# Patient Record
Sex: FEMALE | Race: WHITE
[De-identification: names, ages, dates, MRNs, and addresses within clinical notes are randomized per-mention and may not be internally consistent; named-entity substitution may affect disease eponyms.]

---

## 2021-04-06 ENCOUNTER — HOSPITAL ENCOUNTER (EMERGENCY)
Dept: HOSPITAL 76 - ED | Age: 56
Discharge: HOME | End: 2021-04-06
Payer: MEDICAID

## 2021-04-06 VITALS — SYSTOLIC BLOOD PRESSURE: 151 MMHG | DIASTOLIC BLOOD PRESSURE: 87 MMHG

## 2021-04-06 DIAGNOSIS — E66.9: ICD-10-CM

## 2021-04-06 DIAGNOSIS — I10: ICD-10-CM

## 2021-04-06 DIAGNOSIS — H43.392: ICD-10-CM

## 2021-04-06 DIAGNOSIS — R07.9: Primary | ICD-10-CM

## 2021-04-06 LAB
ALBUMIN DIAFP-MCNC: 4.6 G/DL (ref 3.2–5.5)
ALBUMIN/GLOB SERPL: 1.4 {RATIO} (ref 1–2.2)
ALP SERPL-CCNC: 75 IU/L (ref 42–121)
ALT SERPL W P-5'-P-CCNC: 91 IU/L (ref 10–60)
ANION GAP SERPL CALCULATED.4IONS-SCNC: 9 MMOL/L (ref 6–13)
AST SERPL W P-5'-P-CCNC: 64 IU/L (ref 10–42)
BASOPHILS NFR BLD AUTO: 0.1 10^3/UL (ref 0–0.1)
BASOPHILS NFR BLD AUTO: 0.6 %
BILIRUB BLD-MCNC: 1.3 MG/DL (ref 0.2–1)
BUN SERPL-MCNC: 10 MG/DL (ref 6–20)
CALCIUM UR-MCNC: 9.6 MG/DL (ref 8.5–10.3)
CHLORIDE SERPL-SCNC: 106 MMOL/L (ref 101–111)
CO2 SERPL-SCNC: 27 MMOL/L (ref 21–32)
CREAT SERPLBLD-SCNC: 0.7 MG/DL (ref 0.4–1)
EOSINOPHIL # BLD AUTO: 0.2 10^3/UL (ref 0–0.7)
EOSINOPHIL NFR BLD AUTO: 3 %
ERYTHROCYTE [DISTWIDTH] IN BLOOD BY AUTOMATED COUNT: 12.3 % (ref 12–15)
GFRSERPLBLD MDRD-ARVRAT: 87 ML/MIN/{1.73_M2} (ref 89–?)
GLOBULIN SER-MCNC: 3.3 G/DL (ref 2.1–4.2)
GLUCOSE SERPL-MCNC: 106 MG/DL (ref 70–100)
HCT VFR BLD AUTO: 43.8 % (ref 37–47)
HGB UR QL STRIP: 15.1 G/DL (ref 12–16)
INR PPP: 1.1 (ref 0.8–1.2)
LIPASE SERPL-CCNC: 28 U/L (ref 22–51)
LYMPHOCYTES # SPEC AUTO: 2.6 10^3/UL (ref 1.5–3.5)
LYMPHOCYTES NFR BLD AUTO: 33.4 %
MCH RBC QN AUTO: 32.8 PG (ref 27–31)
MCHC RBC AUTO-ENTMCNC: 34.5 G/DL (ref 32–36)
MCV RBC AUTO: 95.2 FL (ref 81–99)
MONOCYTES # BLD AUTO: 0.6 10^3/UL (ref 0–1)
MONOCYTES NFR BLD AUTO: 7.7 %
NEUTROPHILS # BLD AUTO: 4.2 10^3/UL (ref 1.5–6.6)
NEUTROPHILS # SNV AUTO: 7.8 X10^3/UL (ref 4.8–10.8)
NEUTROPHILS NFR BLD AUTO: 54.4 %
NRBC # BLD AUTO: 0 /100WBC
NRBC # BLD AUTO: 0 X10^3/UL
PDW BLD AUTO: 9.7 FL (ref 7.9–10.8)
PLATELET # BLD: 249 10^3/UL (ref 130–450)
POTASSIUM SERPL-SCNC: 3.9 MMOL/L (ref 3.5–5)
PROT SPEC-MCNC: 7.9 G/DL (ref 6.7–8.2)
PROTHROM ACT/NOR PPP: 11.9 SECS (ref 9.9–12.6)
RBC MAR: 4.6 10^6/UL (ref 4.2–5.4)
SODIUM SERPLBLD-SCNC: 142 MMOL/L (ref 135–145)

## 2021-04-06 PROCEDURE — 85025 COMPLETE CBC W/AUTO DIFF WBC: CPT

## 2021-04-06 PROCEDURE — 84484 ASSAY OF TROPONIN QUANT: CPT

## 2021-04-06 PROCEDURE — 80053 COMPREHEN METABOLIC PANEL: CPT

## 2021-04-06 PROCEDURE — 85610 PROTHROMBIN TIME: CPT

## 2021-04-06 PROCEDURE — 93005 ELECTROCARDIOGRAM TRACING: CPT

## 2021-04-06 PROCEDURE — 84443 ASSAY THYROID STIM HORMONE: CPT

## 2021-04-06 PROCEDURE — 99284 EMERGENCY DEPT VISIT MOD MDM: CPT

## 2021-04-06 PROCEDURE — 83690 ASSAY OF LIPASE: CPT

## 2021-04-06 PROCEDURE — 84439 ASSAY OF FREE THYROXINE: CPT

## 2021-04-06 NOTE — XRAY REPORT
PROCEDURE:  Chest 1 View X-Ray

 

INDICATIONS:  Chest Pain

 

TECHNIQUE:  One view of the chest was acquired.  

 

COMPARISON:  None

 

FINDINGS:  

 

Surgical changes and devices: Surgical clips are seen in the lower neck soft tissue.

 

Lungs and pleura:  No pleural effusions or pneumothorax.  Lungs are clear.  

 

Mediastinum:  Mediastinal contours appear normal.  Heart size is normal.  

 

Bones and chest wall:  No suspicious bony lesions.  Overlying soft tissues appear unremarkable.  

 

IMPRESSION:  

No acute cardiopulmonary pathology.

 

Reviewed by: Jose R Bronson MD on 4/6/2021 1:42 PM PDT

Approved by: Jose R Bronson MD on 4/6/2021 1:42 PM PDT

 

 

Station ID:  [id number]

## 2021-04-06 NOTE — ED PHYSICIAN DOCUMENTATION
History of Present Illness





- Stated complaint


Stated Complaint: CHEST PX





- Chief complaint


Chief Complaint: Cardiac





- Additonal information


Additional information: 


55-year-old female presents emergency department for evaluation of ongoing chest

pain.  She reports that she has had this chest pain for about 6 months.  It is 

described as constant in nature.  Not typically worse with exertion.  She 

sometimes feels pressure in her chest with breathing but does not clearly state 

that it is pleuritic.  She did see her primary care doctor in [place] for 

this about 6 months ago and was told she had costochondritis.  She moved to 

[facility] about 4 months ago and at the time of her move she stopped taking

her hydrochlorothiazide as she reports that her blood pressure was well 

controlled.  This episode of chest pain began about 1 weeks ago and she does not

it followed lifting heavy logs while cutting wood





In addition to the chest pain she reports that yesterday evening she removed her

contacts and for about 20 minutes she had a floaters that were in a crescent 

shape pattern in the peripheral vision of her left eye.  There was no loss of 

vision and there was no eye pain associated with this.  She has no history of 

this previous. Denies a hx of migraines or headache with this event.  has normal

vision without deficits at this time





She denies headaches, nausea uncontrolled vomiting.





She is not a smoker.  Very rare alcohol user.  No cannabis.





Meds: Levothyroxine.








Review of Systems


Constitutional: denies: Fever, Chills


Eyes: reports: Other (visual floaters left eye)


Ears: reports: Reviewed and negative


Nose: reports: Reviewed and negative


Throat: reports: Reviewed and negative


Cardiac: reports: Chest pain / pressure, Palpitations.  denies: Pedal edema, 

Calf pain


Respiratory: reports: Reviewed and negative


GI: reports: Reviewed and negative


: reports: Reviewed and negative


Skin: reports: Reviewed and negative


Musculoskeletal: reports: Reviewed and negative


Neurologic: reports: Reviewed and negative


Psychiatric: reports: Reviewed and negative





PD PAST MEDICAL HISTORY





- Past Medical History


Past Medical History: Yes





- Present Medications


Home Medications: 


                                Ambulatory Orders











 Medication  Instructions  Recorded  Confirmed


 


Hydrochlorothiazide 25 mg PO DAILY #30 tablet 04/06/21 


 


Levothyroxine [Synthroid] 100 mcg PO QDAC 04/06/21 04/06/21














- Allergies


Allergies/Adverse Reactions: 


                                    Allergies











Allergy/AdvReac Type Severity Reaction Status Date / Time


 


codeine AdvReac  Nausea Verified 04/06/21 13:14














- Social History


Does the pt smoke?: No


Smoking Status: Never smoker


Does the pt drink ETOH?: No


Does the pt have substance abuse?: No





- Immunizations


Immunizations are current?: Yes





PD ED PE EXPANDED





- General


General: Alert, No acute distress, Well developed/nourished, Other (obese)





- HEENT


HEENT: Atraumatic, PERRL, EOMI





- Cardiac


Cardiac: Regular Rate, Radial strong equal, Pedal strong equal, Cap refill < 2 

sec.  No: Murmur Present





- Respiratory


Respiratory: Clear to ausultation twan.  No: Distress, Labored





- Abdomen


Abdomen: Normal Bowel sounds.  No: Tender to palpation





- Derm


Derm: Normal color, Warm and dry





- Extremities


Extremities: Normal.  No: Deformity, Tenderness





- Neuro


Neuro: Alert and Oriented X 3, CNII-XII intact





- GCS


Eye Opening: Spontaneous


Motor: Obeys Commands


Verbal: Oriented


Total: 15





Results





- Vitals


Vitals: 


                               Vital Signs - 24 hr











  04/06/21 04/06/21 04/06/21





  13:14 13:26 14:53


 


Temperature 36.5 C 36.5 C 36.4 C L


 


Heart Rate 72 72 63


 


Respiratory 18 18 16





Rate   


 


Blood Pressure 211/92 H 211/92 H 170/83 H


 


O2 Saturation 98 98 93














  04/06/21





  15:36


 


Temperature 36.8 C


 


Heart Rate 59 L


 


Respiratory 20





Rate 


 


Blood Pressure 151/87 H


 


O2 Saturation 98








                                     Oxygen











O2 Source                      Room air

















- Labs


Labs: 


                                Laboratory Tests











  04/06/21 04/06/21 04/06/21





  13:28 13:28 13:28


 


WBC  7.8  


 


RBC  4.60  


 


Hgb  15.1  


 


Hct  43.8  


 


MCV  95.2  


 


MCH  32.8 H  


 


MCHC  34.5  


 


RDW  12.3  


 


Plt Count  249  


 


MPV  9.7  


 


Neut # (Auto)  4.2  


 


Lymph # (Auto)  2.6  


 


Mono # (Auto)  0.6  


 


Eos # (Auto)  0.2  


 


Baso # (Auto)  0.1  


 


Absolute Nucleated RBC  0.00  


 


Nucleated RBC %  0.0  


 


PT   


 


INR   


 


Sodium   142 


 


Potassium   3.9 


 


Chloride   106 


 


Carbon Dioxide   27 


 


Anion Gap   9.0 


 


BUN   10 


 


Creatinine   0.7 


 


Estimated GFR (MDRD)   87 L 


 


Glucose   106 H 


 


Calcium   9.6 


 


Total Bilirubin   1.3 H 


 


AST   64 H 


 


ALT   91 H 


 


Alkaline Phosphatase   75 


 


Troponin I High Sens    5.2


 


Total Protein   7.9 


 


Albumin   4.6 


 


Globulin   3.3 


 


Albumin/Globulin Ratio   1.4 


 


Lipase   28 


 


TSH   


 


Free T4   














  04/06/21 04/06/21 04/06/21





  13:28 13:28 13:28


 


WBC   


 


RBC   


 


Hgb   


 


Hct   


 


MCV   


 


MCH   


 


MCHC   


 


RDW   


 


Plt Count   


 


MPV   


 


Neut # (Auto)   


 


Lymph # (Auto)   


 


Mono # (Auto)   


 


Eos # (Auto)   


 


Baso # (Auto)   


 


Absolute Nucleated RBC   


 


Nucleated RBC %   


 


PT  11.9  


 


INR  1.1  


 


Sodium   


 


Potassium   


 


Chloride   


 


Carbon Dioxide   


 


Anion Gap   


 


BUN   


 


Creatinine   


 


Estimated GFR (MDRD)   


 


Glucose   


 


Calcium   


 


Total Bilirubin   


 


AST   


 


ALT   


 


Alkaline Phosphatase   


 


Troponin I High Sens   


 


Total Protein   


 


Albumin   


 


Globulin   


 


Albumin/Globulin Ratio   


 


Lipase   


 


TSH   6.91 H 


 


Free T4    0.78














PD MEDICAL DECISION MAKING





- ED course


Complexity details: reviewed results, re-evaluated patient, considered 

differential, d/w patient


ED course: 


55-year-old female presents emergency department for 2 concerns.  The first is 

chest pain that she has had intermittently for 6 months. This episode of chest 

pain began about 1 week ago after moving heavy logs.  Patient is obese and does 

have a history of hypertension though untreated at this time.  She is not a 

smoker.  Her heart score is 2.  Screening EKG today is without any acute 

abnormality.  Chest x-ray shows no acute findings.  High-sensitivity troponin is

 negative.  I discussed these findings with the patient.  Given her age and risk

 factors she is appropriate for outpatient evaluation that would likely include 

referral to a cardiologist for an echo as well as a stress test.  Patient feels 

comfortable arranging follow-up as an outpatient.





She also reported that last night for about 20 minutes that she had vision 

changes in the left eye after removing her contacts.  There was no loss of 

vision but she described floaters that were persistent.  We discussed that the 

etiology could be something such as an ocular migraine or less likely a retinal 

detachment as the symptoms fully resolved.  She will continue close follow-up 

with her ophthalmologist and return to the ED as needed.  Visual acuities were 

checked in the ED.  No vision changes in left eye. 





TSH was elevated but T4 was corrected to normal.  Patient will continue her 

current dose of levothyroxine.





Emergent and worrisome return precautions were discussed








Departure





- Departure


Disposition: 01 Home, Self Care


Clinical Impression: 


 Vision abnormalities





Chest pain


Qualifiers:


 Chest pain type: unspecified Qualified Code(s): R07.9 - Chest pain, unspecified





Hypertension


Qualifiers:


 Hypertension type: unspecified Qualified Code(s): I10 - Essential (primary) 

hypertension





Condition: Stable


Record reviewed to determine appropriate education?: Yes


Instructions:  ED Chest Wall Pain Jaki 


Prescriptions: 


Hydrochlorothiazide 25 mg PO DAILY #30 tablet


Comments: 


You were seen in the emergency department today for chest pain and vision 

changes in your left eye.





As we discussed the episode of vision changes in your left eye could have been 

an ocular migraine or may also have been a partial retinal detachment though, as

 we discussed, retinal detachments do not necessarily get better with time.  It 

is important that you discuss this ED visit with your ophthalmologist and 

undergo a thorough eye screening.  If you develop the vision changes again, have

 any loss of vision please return immediately to the ER.





You were also seen for episodic chest pain that began about 6 months ago.  Your 

screening labs and EKG are all fairly unremarkable.  This episode of chest pain 

did develop after doing some heavy lifting with would and it is possible that 

you have strained the muscles between your ribs.  However because you are 55 and

 you do have a history of elevated blood pressure it is important that you get 

referred to a cardiologist for an outpatient stress test and echocardiogram.  I 

would also like to resume your blood pressure medication today and will start 

you on hydrochlorothiazide 25 mg once daily.





Please call out clinics to schedule follow up with a primary doctor as soon as 

possible.  It is okay to take your levothyroxine and hydrochlorothiazide at the 

same time.  





If at any point you develop chest pain that is worse especially with exertion, 

have any fainting episodes, feel suddenly short of breath or cannot breathe then

 please return immediately to the emergency department for a second evaluation.


Discharge Date/Time: 04/06/21 15:38